# Patient Record
Sex: MALE | Race: WHITE | NOT HISPANIC OR LATINO | Employment: FULL TIME | URBAN - METROPOLITAN AREA
[De-identification: names, ages, dates, MRNs, and addresses within clinical notes are randomized per-mention and may not be internally consistent; named-entity substitution may affect disease eponyms.]

---
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
## 2019-01-22 RX ORDER — CYCLOBENZAPRINE HCL 10 MG
10 TABLET ORAL NIGHTLY PRN
Qty: 30 TABLET | Refills: 0 | Status: SHIPPED | OUTPATIENT
Start: 2019-01-22 | End: 2019-02-20 | Stop reason: SDUPTHER

## 2019-02-20 RX ORDER — CYCLOBENZAPRINE HCL 10 MG
10 TABLET ORAL NIGHTLY PRN
Qty: 30 TABLET | Refills: 0 | Status: SHIPPED | OUTPATIENT
Start: 2019-02-20 | End: 2019-03-21 | Stop reason: SDUPTHER

## 2019-03-06 ENCOUNTER — HOSPITAL ENCOUNTER (OUTPATIENT)
Dept: GENERAL RADIOLOGY | Facility: HOSPITAL | Age: 19
Discharge: HOME | End: 2019-03-06
Admitting: NURSE PRACTITIONER

## 2019-03-06 DIAGNOSIS — D17.9 LIPOMA, UNSPECIFIED SITE: ICD-10-CM

## 2019-03-06 PROCEDURE — 71046 X-RAY EXAM CHEST 2 VIEWS: CPT

## 2019-03-21 RX ORDER — CYCLOBENZAPRINE HCL 10 MG
10 TABLET ORAL NIGHTLY PRN
Qty: 30 TABLET | Refills: 0 | Status: SHIPPED | OUTPATIENT
Start: 2019-03-21 | End: 2019-08-17 | Stop reason: SDUPTHER

## 2019-05-20 ENCOUNTER — TELEPHONE (OUTPATIENT)
Dept: ORTHOPEDIC SURGERY | Facility: CLINIC | Age: 19
End: 2019-05-20

## 2019-05-20 NOTE — TELEPHONE ENCOUNTER
"Patient had lumbar back surgery by ELIZABETH last year. He has been seeing his PCP for some chest, shoulder and arm pain. Wants to talk to ELIZABETH about \"nerve pain\" from surgery.  "

## 2019-05-20 NOTE — TELEPHONE ENCOUNTER
"Return to the patient.  He is been having some chest pain that radiates into his shoulder and down his arm.  He describes it as \"nerve pain\" his primary care physician is done a cardiac work-up as well as a chest x-ray and all those are negative.  According to the patient his primary care is leaning toward more anxiety related.  Patient did have one gabapentin left from a previous prescription and he took that which did help the chest and arm pain considerably.  Patient describes the pain as the same kind of pain as he had when he was having lumbar issues with nerve pain radiating down his leg.  Have advised him to contact his primary care physician to have him evaluated to see if the pain is coming from his neck or his shoulder.  If his PCP does think it is related to his neck or shoulder then would recommend that they contact our office to make an appointment for follow-up.  "

## 2019-05-28 RX ORDER — GABAPENTIN 300 MG/1
CAPSULE ORAL
Qty: 60 CAPSULE | Refills: 0 | Status: SHIPPED | OUTPATIENT
Start: 2019-05-28 | End: 2019-06-24 | Stop reason: SDUPTHER

## 2019-06-25 RX ORDER — GABAPENTIN 300 MG/1
CAPSULE ORAL
Qty: 60 CAPSULE | Refills: 0 | Status: SHIPPED | OUTPATIENT
Start: 2019-06-25 | End: 2019-07-22 | Stop reason: SDUPTHER

## 2019-07-22 RX ORDER — GABAPENTIN 300 MG/1
CAPSULE ORAL
Qty: 60 CAPSULE | Refills: 0 | Status: SHIPPED | OUTPATIENT
Start: 2019-07-22 | End: 2019-08-19 | Stop reason: SDUPTHER

## 2019-08-19 RX ORDER — CYCLOBENZAPRINE HCL 10 MG
10 TABLET ORAL NIGHTLY PRN
Qty: 30 TABLET | Refills: 0 | Status: SHIPPED | OUTPATIENT
Start: 2019-08-19 | End: 2022-09-24

## 2019-08-20 RX ORDER — GABAPENTIN 300 MG/1
CAPSULE ORAL
Qty: 60 CAPSULE | Refills: 0 | Status: SHIPPED | OUTPATIENT
Start: 2019-08-20 | End: 2019-09-16 | Stop reason: SDUPTHER

## 2019-09-16 DIAGNOSIS — Z98.890 S/P LUMBAR DISCECTOMY: Primary | ICD-10-CM

## 2019-09-16 RX ORDER — GABAPENTIN 300 MG/1
CAPSULE ORAL
Qty: 60 CAPSULE | Refills: 0 | Status: SHIPPED | OUTPATIENT
Start: 2019-09-16 | End: 2019-10-19 | Stop reason: SDUPTHER

## 2019-10-19 DIAGNOSIS — Z98.890 S/P LUMBAR DISCECTOMY: ICD-10-CM

## 2019-10-28 RX ORDER — GABAPENTIN 300 MG/1
CAPSULE ORAL
Qty: 60 CAPSULE | Refills: 0 | Status: SHIPPED | OUTPATIENT
Start: 2019-10-28 | End: 2022-09-24

## 2021-10-18 ENCOUNTER — HOSPITAL ENCOUNTER (OUTPATIENT)
Dept: GENERAL RADIOLOGY | Facility: HOSPITAL | Age: 21
Discharge: HOME | End: 2021-10-18
Admitting: UROLOGY

## 2021-10-18 DIAGNOSIS — N20.1 CALCULUS OF URETER: ICD-10-CM

## 2021-10-18 DIAGNOSIS — N20.0 CALCULUS, RENAL: ICD-10-CM

## 2021-10-18 PROCEDURE — 74018 RADEX ABDOMEN 1 VIEW: CPT

## 2023-01-10 ENCOUNTER — OFFICE VISIT (OUTPATIENT)
Dept: FAMILY MEDICINE CLINIC | Facility: CLINIC | Age: 23
End: 2023-01-10
Payer: COMMERCIAL

## 2023-01-10 VITALS
DIASTOLIC BLOOD PRESSURE: 72 MMHG | HEART RATE: 92 BPM | SYSTOLIC BLOOD PRESSURE: 128 MMHG | WEIGHT: 190 LBS | BODY MASS INDEX: 28.14 KG/M2 | OXYGEN SATURATION: 98 % | HEIGHT: 69 IN | RESPIRATION RATE: 16 BRPM

## 2023-01-10 DIAGNOSIS — R10.31 CHRONIC BILATERAL LOWER ABDOMINAL PAIN: ICD-10-CM

## 2023-01-10 DIAGNOSIS — N41.1 CHRONIC PROSTATITIS: Primary | ICD-10-CM

## 2023-01-10 DIAGNOSIS — R10.32 CHRONIC BILATERAL LOWER ABDOMINAL PAIN: ICD-10-CM

## 2023-01-10 DIAGNOSIS — R35.0 INCREASED URINARY FREQUENCY: ICD-10-CM

## 2023-01-10 DIAGNOSIS — G89.29 CHRONIC BILATERAL LOWER ABDOMINAL PAIN: ICD-10-CM

## 2023-01-10 DIAGNOSIS — R35.0 BENIGN PROSTATIC HYPERPLASIA WITH URINARY FREQUENCY: ICD-10-CM

## 2023-01-10 DIAGNOSIS — N40.1 BENIGN PROSTATIC HYPERPLASIA WITH URINARY FREQUENCY: ICD-10-CM

## 2023-01-10 PROCEDURE — 99204 OFFICE O/P NEW MOD 45 MIN: CPT | Performed by: FAMILY MEDICINE

## 2023-01-10 NOTE — PROGRESS NOTES
Chief Complaint  Establish Care (New patient, establishing care with Provider. )    Subjective        Hugo Cagle presents to [facility]  History of Present Illness  The patient has consented to the use of LORA.    The patient presents to the clinic to establish care and follow-up on chronic lower abdominal pain, chronic gastritis, and increased urinary frequency.      The patient complains of abdominal pain, bloating, gas, and increased urinary frequency for the past 1 year. He states that he has been having abdominal pain since 09/2020. He states that he has been having 2 to 3 bowel movements a day. The patient states that he feels like his intestines are moving around throughout the day. He states that he feels like he has gas in his stomach constantly.    The patient states that he had back surgery when he was 17 years old. He states that he had kidney stones in 2018 and it has been intermittent. The patient states that he has been to a urologist for 1 year. He states that he has been to 2 neurologists. The patient states that he has had a cystoscopy, blood work, and STD testing. He states that he was diagnosed with prostatitis. Th patient states that he was prescribed Flomax when he was 17 years old once a day for 1 to 2 months when he was diagnosed with kidney stones. He states that it helped with his urinary flow. He reports that he is not on any medications currently. He mentions that he was seeing Dr. Alex Johnson at the time. The patient states that he has a thinning of his stream and semen discharge. He adds that sometimes it is painful to urinate. He states that he has to urinate every 10 minutes when he is on a road trip. The patient mentions that he was given an antibiotic once and it helped. He states that he was offered pelvic therapy but he never heard anything else. He is unsure ever having his PSA checked. The patient states that he feels like there is a bump or  "mass inside the head of his penis. He reports his urologist thought it was normal. He mentions that it has been awhile since he had a CT scan.    The patient states that he uses e-cigarettes. His parents are both relatively healthy. He had an epidural block, lumbar discectomy at 17 years old. He states that he works at [employer]. The patient states that he gets anxious everyday. He denies any depression.    Objective   Vital Signs:  /72   Pulse 92   Resp 16   Ht 175.3 cm (69\")   Wt 86.2 kg (190 lb)   SpO2 98%   BMI 28.06 kg/m²   Estimated body mass index is 28.06 kg/m² as calculated from the following:    Height as of this encounter: 175.3 cm (69\").    Weight as of this encounter: 86.2 kg (190 lb).             Physical Exam  Constitutional:       Appearance: Normal appearance. He is well-developed.   HENT:      Head: Normocephalic and atraumatic.      Right Ear: Tympanic membrane, ear canal and external ear normal.      Left Ear: Tympanic membrane, ear canal and external ear normal.      Nose: Nose normal.      Mouth/Throat:      Mouth: Mucous membranes are moist.   Eyes:      General: No scleral icterus.     Extraocular Movements: Extraocular movements intact.      Conjunctiva/sclera: Conjunctivae normal.      Pupils: Pupils are equal, round, and reactive to light.   Neck:      Thyroid: No thyromegaly.   Cardiovascular:      Rate and Rhythm: Normal rate and regular rhythm.      Pulses: Normal pulses.      Heart sounds: Normal heart sounds.   Pulmonary:      Effort: Pulmonary effort is normal. No respiratory distress.      Breath sounds: Normal breath sounds. No wheezing or rales.   Abdominal:      General: Bowel sounds are normal. There is no distension.      Palpations: Abdomen is soft. There is no mass.      Tenderness: There is no abdominal tenderness.      Hernia: No hernia is present.   Musculoskeletal:         General: No swelling or tenderness. Normal range of motion.      Cervical back: " Normal range of motion and neck supple.   Lymphadenopathy:      Cervical: No cervical adenopathy.   Skin:     General: Skin is warm.   Neurological:      General: No focal deficit present.      Mental Status: He is alert and oriented to person, place, and time.      Cranial Nerves: No cranial nerve deficit.      Sensory: No sensory deficit.      Deep Tendon Reflexes: Reflexes normal.   Psychiatric:         Mood and Affect: Mood normal.         Behavior: Behavior normal.        Result Review :                   Assessment and Plan   Diagnoses and all orders for this visit:    1. Chronic prostatitis (Primary)  -     Cancel: CT Abdomen Pelvis With Contrast; Future  -     CT Abdomen Pelvis With Contrast; Future    2. Chronic bilateral lower abdominal pain  -     Cancel: CT Abdomen Pelvis With Contrast; Future  -     CBC & Differential  -     Comprehensive Metabolic Panel  -     CT Abdomen Pelvis With Contrast; Future    3. Increased urinary frequency  -     Cancel: CT Abdomen Pelvis With Contrast; Future  -     Urinalysis With Microscopic - Urine, Clean Catch  -     CT Abdomen Pelvis With Contrast; Future    4. Benign prostatic hyperplasia with urinary frequency  -     Cancel: CT Abdomen Pelvis With Contrast; Future    Other orders  -     Microscopic Examination -    PLAN:   Hugo Cagle is here to establish care and follow up on chronic lower abdominal pain, chronic gastritis, and increased urinary frequency.     The patient is having these symptoms for more than 2 years. He has seen a urologist in the past. He is complaining of dribbling, thinning of the stream,  with increased urinary frequency and lower abdominal pain. He was diagnosed with BPH. He is also complaining of having a lot of gas and bloating. He has a history of kidney stones in the past. I will schedule him for a CT scan of abdomen and pelvis. Also, I will do CBC and CMP. I advised him to start taking Metamucil fiber every day and MiraLAX. After the  labs and the urinalysis, we might give him a trial of antibiotic for 10 to 14 days for prostatitis. I will also review the labs of his old record from the urology's office. Follow up after the labs and CT scan.         Follow Up   No follow-ups on file.  Patient was given instructions and counseling regarding his condition or for health maintenance advice. Please see specific information pulled into the AVS if appropriate.     Transcribed from ambient dictation for Reena Velez MD by Pushpa Walsh.  01/10/23   16:30 EST    Patient or patient representative verbalized consent to the visit recording.  I have personally performed the services described in this document as transcribed by the above individual, and it is both accurate and complete.

## 2023-01-11 ENCOUNTER — PATIENT ROUNDING (BHMG ONLY) (OUTPATIENT)
Dept: FAMILY MEDICINE CLINIC | Facility: CLINIC | Age: 23
End: 2023-01-11
Payer: COMMERCIAL

## 2023-01-11 LAB
ALBUMIN SERPL-MCNC: 5.4 G/DL (ref 3.5–5.2)
ALBUMIN/GLOB SERPL: 3.2 G/DL
ALP SERPL-CCNC: 92 U/L (ref 39–117)
ALT SERPL-CCNC: 18 U/L (ref 1–41)
APPEARANCE UR: CLEAR
AST SERPL-CCNC: 20 U/L (ref 1–40)
BACTERIA #/AREA URNS HPF: NORMAL /HPF
BASOPHILS # BLD AUTO: 0.04 10*3/MM3 (ref 0–0.2)
BASOPHILS NFR BLD AUTO: 0.4 % (ref 0–1.5)
BILIRUB SERPL-MCNC: 0.5 MG/DL (ref 0–1.2)
BILIRUB UR QL STRIP: NEGATIVE
BUN SERPL-MCNC: 12 MG/DL (ref 6–20)
BUN/CREAT SERPL: 15.4 (ref 7–25)
CALCIUM SERPL-MCNC: 10.2 MG/DL (ref 8.6–10.5)
CASTS URNS MICRO: NORMAL
CHLORIDE SERPL-SCNC: 102 MMOL/L (ref 98–107)
CO2 SERPL-SCNC: 26.7 MMOL/L (ref 22–29)
COLOR UR: YELLOW
CREAT SERPL-MCNC: 0.78 MG/DL (ref 0.76–1.27)
EGFRCR SERPLBLD CKD-EPI 2021: 128.5 ML/MIN/1.73
EOSINOPHIL # BLD AUTO: 0.11 10*3/MM3 (ref 0–0.4)
EOSINOPHIL NFR BLD AUTO: 1.2 % (ref 0.3–6.2)
EPI CELLS #/AREA URNS HPF: NORMAL /HPF
ERYTHROCYTE [DISTWIDTH] IN BLOOD BY AUTOMATED COUNT: 12 % (ref 12.3–15.4)
GLOBULIN SER CALC-MCNC: 1.7 GM/DL
GLUCOSE SERPL-MCNC: 82 MG/DL (ref 65–99)
GLUCOSE UR QL STRIP: NEGATIVE
HCT VFR BLD AUTO: 48.4 % (ref 37.5–51)
HGB BLD-MCNC: 16 G/DL (ref 13–17.7)
HGB UR QL STRIP: NEGATIVE
IMM GRANULOCYTES # BLD AUTO: 0.04 10*3/MM3 (ref 0–0.05)
IMM GRANULOCYTES NFR BLD AUTO: 0.4 % (ref 0–0.5)
KETONES UR QL STRIP: NEGATIVE
LEUKOCYTE ESTERASE UR QL STRIP: NEGATIVE
LYMPHOCYTES # BLD AUTO: 2.36 10*3/MM3 (ref 0.7–3.1)
LYMPHOCYTES NFR BLD AUTO: 26 % (ref 19.6–45.3)
MCH RBC QN AUTO: 29.6 PG (ref 26.6–33)
MCHC RBC AUTO-ENTMCNC: 33.1 G/DL (ref 31.5–35.7)
MCV RBC AUTO: 89.6 FL (ref 79–97)
MONOCYTES # BLD AUTO: 0.46 10*3/MM3 (ref 0.1–0.9)
MONOCYTES NFR BLD AUTO: 5.1 % (ref 5–12)
NEUTROPHILS # BLD AUTO: 6.05 10*3/MM3 (ref 1.7–7)
NEUTROPHILS NFR BLD AUTO: 66.9 % (ref 42.7–76)
NITRITE UR QL STRIP: NEGATIVE
NRBC BLD AUTO-RTO: 0 /100 WBC (ref 0–0.2)
PH UR STRIP: 7 [PH] (ref 5–8)
PLATELET # BLD AUTO: 284 10*3/MM3 (ref 140–450)
POTASSIUM SERPL-SCNC: 4.5 MMOL/L (ref 3.5–5.2)
PROT SERPL-MCNC: 7.1 G/DL (ref 6–8.5)
PROT UR QL STRIP: NEGATIVE
RBC # BLD AUTO: 5.4 10*6/MM3 (ref 4.14–5.8)
RBC #/AREA URNS HPF: NORMAL /HPF
SODIUM SERPL-SCNC: 140 MMOL/L (ref 136–145)
SP GR UR STRIP: 1.02 (ref 1–1.03)
UROBILINOGEN UR STRIP-MCNC: NORMAL MG/DL
WBC # BLD AUTO: 9.06 10*3/MM3 (ref 3.4–10.8)
WBC #/AREA URNS HPF: NORMAL /HPF

## 2023-01-11 NOTE — PROGRESS NOTES
A My-Chart message has been sent to the patient for PATIENT ROUNDING with [facility]

## 2023-01-13 ENCOUNTER — TELEPHONE (OUTPATIENT)
Dept: FAMILY MEDICINE CLINIC | Facility: CLINIC | Age: 23
End: 2023-01-13
Payer: COMMERCIAL

## 2023-01-13 NOTE — TELEPHONE ENCOUNTER
Left vm to call back      ----- Message from Reena Velez MD sent at 1/12/2023  8:35 PM EST -----  Al labs normal , lets wait for ct scan before I start you on antibitics

## 2023-01-26 ENCOUNTER — HOSPITAL ENCOUNTER (OUTPATIENT)
Dept: CT IMAGING | Facility: HOSPITAL | Age: 23
Discharge: HOME | End: 2023-01-26
Admitting: FAMILY MEDICINE
Payer: COMMERCIAL

## 2023-01-26 DIAGNOSIS — G89.29 CHRONIC BILATERAL LOWER ABDOMINAL PAIN: ICD-10-CM

## 2023-01-26 DIAGNOSIS — N41.1 CHRONIC PROSTATITIS: ICD-10-CM

## 2023-01-26 DIAGNOSIS — R35.0 INCREASED URINARY FREQUENCY: ICD-10-CM

## 2023-01-26 DIAGNOSIS — R10.32 CHRONIC BILATERAL LOWER ABDOMINAL PAIN: ICD-10-CM

## 2023-01-26 DIAGNOSIS — R10.31 CHRONIC BILATERAL LOWER ABDOMINAL PAIN: ICD-10-CM

## 2023-01-26 PROCEDURE — 25010000002 IOPAMIDOL 61 % SOLUTION: Performed by: FAMILY MEDICINE

## 2023-01-26 PROCEDURE — 0 DIATRIZOATE MEGLUMINE & SODIUM PER 1 ML: Performed by: FAMILY MEDICINE

## 2023-01-26 PROCEDURE — 74177 CT ABD & PELVIS W/CONTRAST: CPT

## 2023-01-26 RX ADMIN — IOPAMIDOL 85 ML: 612 INJECTION, SOLUTION INTRAVENOUS at 09:42

## 2023-01-26 RX ADMIN — DIATRIZOATE MEGLUMINE AND DIATRIZOATE SODIUM 30 ML: 660; 100 LIQUID ORAL; RECTAL at 08:35

## 2023-01-31 ENCOUNTER — TELEPHONE (OUTPATIENT)
Dept: FAMILY MEDICINE CLINIC | Facility: CLINIC | Age: 23
End: 2023-01-31
Payer: COMMERCIAL

## 2023-01-31 NOTE — TELEPHONE ENCOUNTER
Caller: Hugo Cagle    Relationship: Self    Best call back number: [phone]    Caller requesting test results: SELF    What test was performed: CT SCAN    When was the test performed: 1/26/23    Where was the test performed: HOSPITAL    Additional notes: PLEASE CALL AND GO OVER RESULTS, HE HAS FURTHER QUESTIONS ALSO.

## 2023-02-02 ENCOUNTER — OFFICE VISIT (OUTPATIENT)
Dept: FAMILY MEDICINE CLINIC | Facility: CLINIC | Age: 23
End: 2023-02-02
Payer: COMMERCIAL

## 2023-02-02 VITALS
WEIGHT: 191 LBS | SYSTOLIC BLOOD PRESSURE: 141 MMHG | TEMPERATURE: 96.8 F | BODY MASS INDEX: 28.29 KG/M2 | OXYGEN SATURATION: 97 % | DIASTOLIC BLOOD PRESSURE: 76 MMHG | HEART RATE: 77 BPM | HEIGHT: 69 IN

## 2023-02-02 DIAGNOSIS — N41.1 CHRONIC PROSTATITIS: Primary | ICD-10-CM

## 2023-02-02 DIAGNOSIS — R35.0 INCREASED URINARY FREQUENCY: ICD-10-CM

## 2023-02-02 DIAGNOSIS — N50.811 TESTICULAR PAIN, RIGHT: ICD-10-CM

## 2023-02-02 PROCEDURE — 99214 OFFICE O/P EST MOD 30 MIN: CPT | Performed by: FAMILY MEDICINE

## 2023-02-02 RX ORDER — TAMSULOSIN HYDROCHLORIDE 0.4 MG/1
1 CAPSULE ORAL DAILY
Qty: 90 CAPSULE | Refills: 0 | Status: SHIPPED | OUTPATIENT
Start: 2023-02-02

## 2023-02-02 RX ORDER — SULFAMETHOXAZOLE AND TRIMETHOPRIM 800; 160 MG/1; MG/1
1 TABLET ORAL 2 TIMES DAILY
Qty: 28 TABLET | Refills: 0 | Status: SHIPPED | OUTPATIENT
Start: 2023-02-02

## 2023-02-02 NOTE — PROGRESS NOTES
"Chief Complaint  Abdominal Pain (F/u on CT ), Testicle Pain (Left x occurring more often in last week ), and Urinary Frequency (Worsened in last week )    Subjective        Hugo Cagle presents to [facility]  History of Present Illness for follow-up on lower abdominal pain.  Patient with history of chronic lower abdominal pain diagnosed with BPH.  Not on any medication.  Complaining of thinning of the stream and dribbling of the urine and increased urinary frequency.  He is also complaining on and off testicular pain which has increased in last 1 week.  Has seen urologist before.  Denies any fever denies any nausea vomiting CT scan was done he is here to discuss the results    Objective   Vital Signs:  /76   Pulse 77   Temp 96.8 °F (36 °C) (Temporal)   Ht 175.3 cm (69\")   Wt 86.6 kg (191 lb)   SpO2 97%   BMI 28.21 kg/m²   Estimated body mass index is 28.21 kg/m² as calculated from the following:    Height as of this encounter: 175.3 cm (69\").    Weight as of this encounter: 86.6 kg (191 lb).             Physical Exam  Constitutional:       General: He is not in acute distress.     Appearance: Normal appearance. He is well-developed.   HENT:      Head: Normocephalic and atraumatic.      Right Ear: Tympanic membrane normal.      Left Ear: Tympanic membrane normal.      Mouth/Throat:      Mouth: Mucous membranes are moist.   Eyes:      General:         Right eye: No discharge.         Left eye: No discharge.      Extraocular Movements: Extraocular movements intact.      Pupils: Pupils are equal, round, and reactive to light.   Cardiovascular:      Rate and Rhythm: Normal rate and regular rhythm.      Pulses: Normal pulses.      Heart sounds: Normal heart sounds.   Pulmonary:      Effort: Pulmonary effort is normal.      Breath sounds: Normal breath sounds. No wheezing or rales.   Abdominal:      General: Bowel sounds are normal.      Palpations: Abdomen is soft. There is " no mass.      Tenderness: There is no abdominal tenderness.   Musculoskeletal:      Cervical back: Normal range of motion and neck supple.      Right lower leg: No edema.      Left lower leg: No edema.   Lymphadenopathy:      Cervical: No cervical adenopathy.   Neurological:      General: No focal deficit present.      Mental Status: He is alert and oriented to person, place, and time.        Result Review :      Data reviewed: Radiologic studies ct scan abdomen and pelvis    IMPRESSION:  1.  No acute intra-abdominal or pelvic process.  2.  Punctate nonobstructing bilateral nephrolithiasis.  3.  Please see above for additional findings.          Assessment and Plan   Diagnoses and all orders for this visit:    1. Chronic prostatitis (Primary)  -     Ambulatory Referral to Urology  -     sulfamethoxazole-trimethoprim (Bactrim DS) 800-160 MG per tablet; Take 1 tablet by mouth 2 (Two) Times a Day.  Dispense: 28 tablet; Refill: 0  -     tamsulosin (FLOMAX) 0.4 MG capsule 24 hr capsule; Take 1 capsule by mouth Daily.  Dispense: 90 capsule; Refill: 0    2. Increased urinary frequency  -     Ambulatory Referral to Urology    3. Testicular pain, right  -     US scrotum and testicles      Hugo is a 22-year-old male patient seen today for follow-up on abdominal pain and urinary frequency and thinning of the stream.  I again reviewed urologist note and they have diagnosed him with BPH but currently he is not on any medication CT scan finding discussed with patient has calcification on prostate but all his symptoms will start him on antibiotic for 2 weeks we will also start him Flomax for his urinary symptoms.  Will refer him to urologist he is also complaining of testicular pain which is going on for long time but had experienced similar pain 2 or 3 times last week.  Will schedule him for ultrasound scrotum and testes.  Follow-up in 2 weeks.  Also advised patient to start  probiotic with antibiotic         Follow Up   No  follow-ups on file.  Patient was given instructions and counseling regarding his condition or for health maintenance advice. Please see specific information pulled into the AVS if appropriate.

## 2023-02-03 ENCOUNTER — TELEPHONE (OUTPATIENT)
Dept: FAMILY MEDICINE CLINIC | Facility: CLINIC | Age: 23
End: 2023-02-03

## 2023-02-03 NOTE — TELEPHONE ENCOUNTER
Caller: Hugo Cagle    Relationship: Self    Best call back number: [phone]    What specialty or service is being requested: UROLOGY    What is the provider, practice or medical service name: [facility]    Any additional details: PATIENT STATES THAT HE RECEIVED A CALL FROM [facility] FOR A REFERRAL, BUT HAS SEEN THEM IN THE PAST AND THEY DID NOT HELP. REQUESTS TO HAVE REFERRAL SENT TO [facility]

## 2023-02-17 ENCOUNTER — HOSPITAL ENCOUNTER (OUTPATIENT)
Dept: ULTRASOUND IMAGING | Facility: HOSPITAL | Age: 23
Discharge: HOME | End: 2023-02-17
Admitting: FAMILY MEDICINE
Payer: COMMERCIAL

## 2023-02-17 PROCEDURE — 76870 US EXAM SCROTUM: CPT

## 2023-02-17 PROCEDURE — 93976 VASCULAR STUDY: CPT

## 2023-04-29 DIAGNOSIS — N41.1 CHRONIC PROSTATITIS: ICD-10-CM

## 2023-05-01 RX ORDER — TAMSULOSIN HYDROCHLORIDE 0.4 MG/1
1 CAPSULE ORAL DAILY
Qty: 90 CAPSULE | Refills: 1 | Status: SHIPPED | OUTPATIENT
Start: 2023-05-01

## 2023-05-01 NOTE — TELEPHONE ENCOUNTER
Rx Refill Note  Requested Prescriptions     Pending Prescriptions Disp Refills   • tamsulosin (FLOMAX) 0.4 MG capsule 24 hr capsule [Pharmacy Med Name: TAMSULOSIN 0.4MG CAPSULES] 90 capsule 1     Sig: TAKE 1 CAPSULE BY MOUTH DAILY      Last office visit with prescribing clinician: 2/2/2023   Last telemedicine visit with prescribing clinician: Visit date not found   Next office visit with prescribing clinician: Visit date not found       {TIP  Please add Last Relevant Lab Date if appropriate: 01/10/23                 Would you like a call back once the refill request has been completed: [] Yes [] No    If the office needs to give you a call back, can they leave a voicemail: [] Yes [] No    Josey Jimenez MA  05/01/23, 10:19 EDT

## 2023-06-01 PROBLEM — L60.0 INGROWN NAIL: Status: ACTIVE | Noted: 2018-07-30

## 2023-06-01 PROBLEM — F41.9 ANXIETY: Status: ACTIVE | Noted: 2018-10-25

## 2023-06-01 PROBLEM — R03.0 ELEVATED BLOOD PRESSURE READING: Status: ACTIVE | Noted: 2020-11-04

## 2023-06-01 PROBLEM — J10.1 INFLUENZA DUE TO INFLUENZA A VIRUS: Status: ACTIVE | Noted: 2019-01-09

## 2023-06-01 PROBLEM — G47.9 DIFFICULTY SLEEPING: Status: ACTIVE | Noted: 2018-07-30

## 2023-06-01 PROBLEM — L01.00 IMPETIGO: Status: ACTIVE | Noted: 2019-02-15

## 2023-06-01 PROBLEM — IMO0002 PARONYCHIA: Status: ACTIVE | Noted: 2018-08-21

## 2023-06-01 PROBLEM — R07.89 CHEST WALL PAIN: Status: ACTIVE | Noted: 2018-10-25

## 2023-06-01 PROBLEM — D17.9 LIPOMA: Status: ACTIVE | Noted: 2018-11-01

## 2023-06-01 PROBLEM — J20.9 ACUTE BRONCHITIS: Status: ACTIVE | Noted: 2019-11-21

## 2023-06-01 PROBLEM — K21.9 GASTROESOPHAGEAL REFLUX DISEASE: Status: ACTIVE | Noted: 2018-11-01

## 2023-06-01 PROBLEM — F42.9 OBSESSIVE-COMPULSIVE DISORDER: Status: ACTIVE | Noted: 2020-09-28

## 2023-09-05 ENCOUNTER — OFFICE VISIT (OUTPATIENT)
Dept: FAMILY MEDICINE CLINIC | Facility: CLINIC | Age: 23
End: 2023-09-05
Payer: COMMERCIAL

## 2023-09-05 VITALS
OXYGEN SATURATION: 97 % | TEMPERATURE: 96.2 F | DIASTOLIC BLOOD PRESSURE: 68 MMHG | HEIGHT: 69 IN | WEIGHT: 174.7 LBS | HEART RATE: 95 BPM | BODY MASS INDEX: 25.87 KG/M2 | SYSTOLIC BLOOD PRESSURE: 110 MMHG

## 2023-09-05 DIAGNOSIS — N50.811 TESTICULAR PAIN, RIGHT: Primary | ICD-10-CM

## 2023-09-05 DIAGNOSIS — R10.31 RIGHT LOWER QUADRANT PAIN: ICD-10-CM

## 2023-09-05 NOTE — PROGRESS NOTES
"Chief Complaint  Hernia (Pelvic Floor therapy discuss.  Testicular pain has increased since last time (1 year ago).)    Subjective        Hugo Cagle presents to [facility]  History of Present Illness as above.  History of chronic testicular pain.  Finished pelvic floor exercises want to discuss the pelvic floor exercises and hernia.  No new symptoms since last visit    Objective   Vital Signs:  /68   Pulse 95   Temp 96.2 °F (35.7 °C) (Temporal)   Ht 175.3 cm (69.02\")   Wt 79.2 kg (174 lb 11.2 oz)   SpO2 97%   BMI 25.79 kg/m²   Estimated body mass index is 25.79 kg/m² as calculated from the following:    Height as of this encounter: 175.3 cm (69.02\").    Weight as of this encounter: 79.2 kg (174 lb 11.2 oz).             Physical Exam   Result Review :      Data reviewed : Radiologic studies ct abdomen    Narrative & Impression   CT ABDOMEN AND PELVIS WITH IV CONTRAST     HISTORY: Lower abdominal pain. Chronic prostatitis. Increased urinary  frequency.     TECHNIQUE: Radiation dose reduction techniques were utilized, including  automated exposure control and exposure modulation based on body size.   3 mm images were obtained through the abdomen and pelvis after the  administration of IV contrast.      COMPARISON: 07/03/2017     FINDINGS:  Lower chest: Within normal limits.     Liver: Within normal limits.     Biliary tract: Within normal limits.     Spleen: Within normal limits.     Pancreas: Within normal limits.     Adrenals: Within normal limits.     Kidneys:  Punctate nonobstructing stones in the inter and lower pole of  the left kidney. Punctate nonobstructing stone right lower pole.     Bowel:  Within normal limits. Normal appendix.     Peritoneum: Within normal limits.     Vasculature:    Within normal limits. Circumaortic left renal vein.     Lymph Nodes:  Within normal limits.                                                            Pelvic organs: The " bladder is incompletely distended. Prostatic  calcifications. No focal fluid collections or inflammatory changes.     Abdominal/Pelvic Wall: Tiny fat-containing umbilical hernia.     Bones: Within normal limits. Stable sclerotic focus in the left ischium  favoring a bone island.     IMPRESSION:  1.  No acute intra-abdominal or pelvic process.  2.  Punctate nonobstructing bilateral nephrolithiasis.  3.  Please see above for additional findings.      Ultrasound testies and scrotum    1.  Mobile echogenic foci in the region of the left epididymal body,  which are nonspecific but can be seen with epididymal obstruction, less  likely filarial infection in the appropriate clinical context.  2.  Normal sonographic appearance of the testes.                 Assessment and Plan   Diagnoses and all orders for this visit:    1. Testicular pain, right (Primary)    2. Right lower quadrant pain      Patient left the exam room without finishing the visit.  23-year-old male patient with history of testicular pain and right abdominal pain for long time has seen to urologist.  He has seen me for the same last year we referred to [facility].  As per patient they were very rude.  He has also finished pelvic floor exercises.  Which has helped him little with the pain.  When I tried to tell patient that tiny medical hernia cannot cause this 1 symptoms what I can refer him to urology but I do not have too much for him to offer for his chronic testicular pain and right lower quadrant pain.  Patient got upset and left the exam room without me finishing the examination.       Follow Up   There are no Patient Instructions on file for this visit.   No follow-ups on file.  Patient was given instructions and counseling regarding his condition or for health maintenance advice. Please see specific information pulled into the AVS if appropriate.     Answers submitted by the patient for this visit:  Office Visit on 9/5/2023  3:00 PM with Reena  Darlin Velez MD  Primary Reason for Visit (Submitted on 9/5/2023)  What is the primary reason for your visit?: Other

## 2024-05-15 ENCOUNTER — TELEPHONE (OUTPATIENT)
Dept: FAMILY MEDICINE CLINIC | Facility: CLINIC | Age: 24
End: 2024-05-15
Payer: COMMERCIAL

## 2024-05-16 ENCOUNTER — OFFICE VISIT (OUTPATIENT)
Dept: FAMILY MEDICINE CLINIC | Facility: CLINIC | Age: 24
End: 2024-05-16
Payer: COMMERCIAL

## 2024-05-16 VITALS
OXYGEN SATURATION: 98 % | HEIGHT: 69 IN | HEART RATE: 92 BPM | WEIGHT: 193.7 LBS | RESPIRATION RATE: 12 BRPM | SYSTOLIC BLOOD PRESSURE: 133 MMHG | BODY MASS INDEX: 28.69 KG/M2 | TEMPERATURE: 98.7 F | DIASTOLIC BLOOD PRESSURE: 89 MMHG

## 2024-05-16 DIAGNOSIS — K62.89 CYST OF PERIANAL AREA: Primary | ICD-10-CM

## 2024-05-16 PROCEDURE — 99213 OFFICE O/P EST LOW 20 MIN: CPT | Performed by: NURSE PRACTITIONER

## 2024-05-16 NOTE — PROGRESS NOTES
"Chief Complaint  Cyst    Subjective        Hugo Cagle presents to [facility]  History of Present Illness    History of Present Illness  The patient is a 23-year-old male who presents for evaluation of a bump or cyst in the rectal area.    The patient reports a palpable, non-painful cyst in the rectal area that has been present for approximately 2 weeks. Initially, he attributed the cyst to a hemorrhoid and subsequently applied hemorrhoid cream, Preparation H, which resulted in a reduction of the cyst's size, however, the cyst remained. The patient denies experiencing constipation, but notes irregular bowel movements, with occasional loose stools. Also denies any presence of blood in stool. This is a new development for him. He has been diagnosed with pelvic floor dysfunction and engages in daily pelvic stretches, which often result in significant tightness in the area. Has previously undergone physical therapy for this and is uncertain if the current symptoms are interconnected. Attempts to avoid wiping and does not rinse the area post-defecation. He denies experiencing fever or chills. The cyst is not painful unless pressure is applied.    Supplemental Information  For the last 3 to 4 days,  hands and jaw have been a little shaky.  thinks it could be anxiety because it happens when sitting at night watching TV.       Objective   Vital Signs:  /89   Pulse 92   Temp 98.7 °F (37.1 °C) (Temporal)   Resp 12   Ht 175.3 cm (69.02\")   Wt 87.9 kg (193 lb 11.2 oz)   SpO2 98%   BMI 28.59 kg/m²   Estimated body mass index is 28.59 kg/m² as calculated from the following:    Height as of this encounter: 175.3 cm (69.02\").    Weight as of this encounter: 87.9 kg (193 lb 11.2 oz).             Physical Exam  Vitals and nursing note reviewed.   Constitutional:       General: He is not in acute distress.     Appearance: He is well-developed. He is not ill-appearing or diaphoretic. "   HENT:      Head: Normocephalic and atraumatic.   Eyes:      General:         Right eye: No discharge.         Left eye: No discharge.      Conjunctiva/sclera: Conjunctivae normal.   Pulmonary:      Effort: Pulmonary effort is normal.   Genitourinary:     Comments: Grey cystic lesion appr 5PM adjacent to anus-no erythema-mildly tender with deep palpation, no discharge  Musculoskeletal:         General: No deformity.      Comments: Gait smooth and steady   Skin:     General: Skin is warm and dry.   Neurological:      Mental Status: He is alert and oriented to person, place, and time.   Psychiatric:         Mood and Affect: Mood normal.         Behavior: Behavior normal.          Physical Exam       Result Review :            Results                  Assessment and Plan     Diagnoses and all orders for this visit:    1. Cyst of perianal area (Primary)  -     Ambulatory Referral to Colorectal Surgery        Assessment & Plan  1. Presence of a cyst in the rectal area.  The patient is advised to rinse the area post-defecation to mitigate the irritation. An urgent referral will be made to a colorectal specialist for potential surgical removal of the cyst. The patient is advised to perform Sitz baths once or twice daily. The application of bacitracin ointment is also recommended. In the event of symptom exacerbation, pain, fever, or chills, the patient is advised to seek immediate medical attention at an urgent care facility or send a message.            Follow Up     No follow-ups on file.  Patient was given instructions and counseling regarding his condition or for health maintenance advice. Please see specific information pulled into the AVS if appropriate.    Patient or patient representative verbalized consent for the use of Ambient Listening during the visit with  ARON Cueto for chart documentation. 5/30/2024  16:30 EDT      Answers submitted by the patient for this visit:  Primary Reason for Visit  (Submitted on 5/16/2024)  What is the primary reason for your visit?: Other  Other (Submitted on 5/16/2024)  Please describe your symptoms.: bump/ cyst or something on rectum.. for , have never had hemmorhoid so i got some ointment and it shrunk the size but still is a good size  and uncomfortable but does not seek to be going away. i do stretches daily for my pelivc floor, my muscles and everything is usually pretty tight in that area, so it does add on to the discomfort, but i am not sure if it is a cyst, a mass , a hemmorhoid etc.  Have you had these symptoms before?: No  How long have you been having these symptoms?: 1-2 weeks  Please describe any probable cause for these symptoms. : unsure, with my pelvic floor issues, my pelvic muscles, peroneal nerve and rectum is typically very tight im not sure if that has any correaltion but with it being in the same area it may be useful to mention.

## 2024-05-20 ENCOUNTER — OFFICE VISIT (OUTPATIENT)
Dept: SURGERY | Facility: CLINIC | Age: 24
End: 2024-05-20
Payer: COMMERCIAL

## 2024-05-20 VITALS
HEART RATE: 81 BPM | SYSTOLIC BLOOD PRESSURE: 122 MMHG | OXYGEN SATURATION: 99 % | HEIGHT: 69 IN | BODY MASS INDEX: 29.31 KG/M2 | DIASTOLIC BLOOD PRESSURE: 84 MMHG | WEIGHT: 197.9 LBS

## 2024-05-20 DIAGNOSIS — K64.5 THROMBOSED EXTERNAL HEMORRHOID: Primary | ICD-10-CM

## 2024-05-20 PROCEDURE — 46600 DIAGNOSTIC ANOSCOPY SPX: CPT | Performed by: PHYSICIAN ASSISTANT

## 2024-05-20 PROCEDURE — 99203 OFFICE O/P NEW LOW 30 MIN: CPT | Performed by: PHYSICIAN ASSISTANT

## 2024-05-20 NOTE — PROGRESS NOTES
"Hugo Cagle is a 23 y.o. male who is seen as a consult at the request of ARON Cueto for Cyst (Perianal Area/).      HPI:  Pt presents today for evaluation of a perianal bump that appeared 2-3 weeks ago.  States that the bump was tender initially.   Pain has since subsided, but still aware that it is there.  States the bump has remained relatively the same size.  No bleeding or drainage.   Used hemorrhoid cream without improvement.   No Hx of perirectal abscesses.   Pt was seen by his PCP 05/16/2024 and told the area was a cyst.     Typically has 3-4 BM daily.   Stools have been looser lately.  Had 1-2 episodes where he noticed \"white foam\" in his stool.  He states his girlfriend had a GI virus x1 day last week.  He denies any other ill contacts.  No recent travel or use of antibiotics.     Pt reports Hx of pelvic floor dysfunction.  States his sphincter muscle feels tight.  Attended PT in the past and continues to perform exercises and breathing techniques during defecation.     Not taking any ASA or anticoagulation.   No previous colonoscopy.   No FHx of colon polyps, colon cancer, or IBD.        Past Medical History:   Diagnosis Date    ADHD (attention deficit hyperactivity disorder)     Benign prostatic hyperplasia 2022    possible prostatitis    History of kidney stones     Injury of back     Kidney stone 7/1/2018    Low back pain     Lumbar herniated disc     PONV (postoperative nausea and vomiting)        Past Surgical History:   Procedure Laterality Date    EAR TUBES      EPIDURAL BLOCK      LUMBAR LAMINECTOMY DISCECTOMY DECOMPRESSION N/A 4/16/2018    Procedure: Right L4-5 discectomy;  Surgeon: Brien Talbot MD;  Location: [facility] OR;  Service: Neurosurgery    LUMBAR LAMINECTOMY DISCECTOMY DECOMPRESSION Right 6/7/2018    Procedure: repeat right L4-5 discectomy;  Surgeon: Brien Talbot MD;  Location: [facility] OR;  Service: Neurosurgery    MYRINGOTOMY WITH REMOVAL OF EAR TUBES   "    NOSE SURGERY         Social History:   reports that he quit smoking about 4 months ago. His smoking use included electronic cigarette. He started smoking about 4 years ago. He has been exposed to tobacco smoke. He quit smokeless tobacco use about 3 months ago. He reports that he does not currently use alcohol. He reports that he does not use drugs.      Marriage status: Single    Family History   Problem Relation Age of Onset    Cancer Maternal Uncle         throat    COPD Maternal Grandmother     Heart disease Maternal Grandmother     Diabetes Maternal Grandmother     COPD Maternal Grandfather     Heart disease Paternal Grandmother     Cancer Paternal Grandfather         lung, bladder    Malig Hyperthermia Neg Hx        No current outpatient medications on file.    Allergy  Tramadol      Vitals:    05/20/24 1126   BP: 122/84   Pulse: 81   SpO2: 99%     Body mass index is 29.22 kg/m².        Physical Exam  Exam conducted with a chaperone present.   Constitutional:       General: He is not in acute distress.     Appearance: He is well-developed.   HENT:      Head: Normocephalic and atraumatic.      Nose: Nose normal.   Eyes:      Conjunctiva/sclera: Conjunctivae normal.      Pupils: Pupils are equal, round, and reactive to light.   Neck:      Trachea: No tracheal deviation.   Pulmonary:      Effort: Pulmonary effort is normal. No respiratory distress.      Breath sounds: Normal breath sounds.   Abdominal:      General: Bowel sounds are normal. There is no distension.      Palpations: Abdomen is soft.   Genitourinary:     Comments: Perianal exam: Small thrombosed external hemorrhoid in the right anterior position. Soft, non-tender.    CELSA- Incresaed tone, no masses  Anoscopy performed: Internal hemorrhoids WNL.   Musculoskeletal:         General: No deformity. Normal range of motion.      Cervical back: Normal range of motion.   Skin:     General: Skin is warm and dry.   Neurological:      Mental Status: He is  alert and oriented to person, place, and time.      Cranial Nerves: No cranial nerve deficit.      Coordination: Coordination normal.      Gait: Gait normal.   Psychiatric:         Behavior: Behavior normal.         Judgment: Judgment normal.         Review of Medical Record:  I reviewed medical records as detailed in HPI.     Assessment:  1. Thrombosed external hemorrhoid    - New    Plan:  - Discussed physical exam findings with Pt. He has a resolving thrombosed hemorrhoid in the right anterior position. He is past the point where a thrombectomy would be beneficial as it has been 2-3 weeks since symptom onset. Discussed with Pt that the clot will continue to dissolve with time.   - For the increased sphincter tone, an Rx for Diltiazem/Lidocaine compound cream was sent to [facility]. Pt instructed to apply externally TID x10 weeks. May consider chemical sphincterotomy with Botox or a lateral internal anal sphincterotomy if symptoms do not improve with conservative management.   - Will consider colonoscopy at next office visit if symptoms do not improve.   - Follow up in 6-8 weeks for reevaluation

## 2024-05-24 ENCOUNTER — PATIENT ROUNDING (BHMG ONLY) (OUTPATIENT)
Dept: SURGERY | Facility: CLINIC | Age: 24
End: 2024-05-24
Payer: COMMERCIAL

## 2024-05-24 NOTE — PROGRESS NOTES
May 24, 2024    Hello, may I speak with Hugo Cagle?    My name is Jacklyn Messina      I am  with [facility]  [address]  [place] [id number] [phone].    Before we get started may I verify your date of birth? 2000    I am calling to officially welcome you to our practice and ask about your recent visit. Is this a good time to talk? Patient hung up the phone and did not get to speak with him. Will call back     Tell me about your visit with us. What things went well?         We're always looking for ways to make our patients' experiences even better. Do you have recommendations on ways we may improve?      Overall were you satisfied with your first visit to our practice?        I appreciate you taking the time to speak with me today. Is there anything else I can do for you?       Thank you, and have a great day.

## 2024-07-09 ENCOUNTER — OFFICE VISIT (OUTPATIENT)
Dept: SURGERY | Facility: CLINIC | Age: 24
End: 2024-07-09
Payer: COMMERCIAL

## 2024-07-09 VITALS
WEIGHT: 209.5 LBS | DIASTOLIC BLOOD PRESSURE: 68 MMHG | HEIGHT: 68 IN | OXYGEN SATURATION: 99 % | HEART RATE: 79 BPM | BODY MASS INDEX: 31.75 KG/M2 | SYSTOLIC BLOOD PRESSURE: 118 MMHG

## 2024-07-09 DIAGNOSIS — K64.5 THROMBOSED EXTERNAL HEMORRHOID: Primary | ICD-10-CM

## 2024-07-09 DIAGNOSIS — K62.89 ANAL PAIN: ICD-10-CM

## 2024-07-09 RX ORDER — DILTIAZEM HCL
POWDER (GRAM) MISCELLANEOUS
COMMUNITY
Start: 2024-05-28

## 2024-07-09 NOTE — PROGRESS NOTES
"Hugo Cagle is a 24 y.o. male in for follow up of Thrombosed external hemorrhoid    Anal pain    Pt was last seen on 05/20/2024 for a thrombosed hemorrhoid and increased sphincter tone.   Conservative treatment for the hemorrhoid was recommended and Pt was given an Rx for Diltiazem/Lidocaine compound cream for the increased sphincter tone.   He presents today for a follow up.  States that the hemorrhoid resolved 1-2 weeks after our last office visit and has not experienced any additional flare-ups since then.   Feels improvement in the sphincter tone and does not feel it is as tight.   He has been less consistent with applying this cream though and states on days he does not apply it, the area feels tight again.     /68 (BP Location: Right arm, Patient Position: Sitting, Cuff Size: Adult)   Pulse 79   Ht 172.7 cm (68\")   Wt 95 kg (209 lb 8 oz)   SpO2 99%   BMI 31.85 kg/m²   Body mass index is 31.85 kg/m².      PE:   Physical Exam  Constitutional:       General: He is not in acute distress.     Appearance: He is well-developed.   HENT:      Head: Normocephalic and atraumatic.   Abdominal:      General: There is no distension.      Palpations: Abdomen is soft.   Musculoskeletal:         General: Normal range of motion.   Neurological:      Mental Status: He is alert.   Psychiatric:         Thought Content: Thought content normal.         Assessmnet:   1. Thrombosed external hemorrhoid    2. Anal pain    - Improved    Plan:  - Discussed treatment options with Pt for the increased sphincter tone including continuation of the Diltiazem/Lidocaine compound cream vs chemical sphincterotomy with Botox vs LIAS. Both procedures, success rates, recovery time, expected outcomes, as all as risks and benefits discussed. After discussing options, Pt states that he learned stretches during pelvic floor PT that he has not performed lately. Would like to restart these exercises and continue the compound cream. Will " consider a flexible sigmoidoscopy with chemical sphincterotomy with Botox in the future if symptoms do not improve.   - Follow up as needed for new or worsening symptoms.

## (undated) DEVICE — APPL DURAPREP IODOPHOR APL 26ML

## (undated) DEVICE — ADHS SKIN DERMABOND TOP ADVANCED

## (undated) DEVICE — SUT VIC 1 OS8 27IN J699H

## (undated) DEVICE — GLV SURG BIOGEL LTX PF 7 1/2

## (undated) DEVICE — ANTIBACTERIAL UNDYED BRAIDED (POLYGLACTIN 910), SYNTHETIC ABSORBABLE SUTURE: Brand: COATED VICRYL

## (undated) DEVICE — GLV SURG BIOGEL LTX PF 7

## (undated) DEVICE — 6.0MM PRECISION ROUND

## (undated) DEVICE — PK NEURO SPINE 40

## (undated) DEVICE — ENCORE® LATEX ORTHO SIZE 8, STERILE LATEX POWDER-FREE SURGICAL GLOVE: Brand: ENCORE

## (undated) DEVICE — OIL CARTRIDGE: Brand: CORE, MAESTRO

## (undated) DEVICE — GOWN,ECLIPSE,FABRIC-REINFORCED,X-LARGE: Brand: MEDLINE

## (undated) DEVICE — OCCLUSIVE GAUZE STRIP,3% BISMUTH TRIBROMOPHENATE IN PETROLATUM BLEND: Brand: XEROFORM

## (undated) DEVICE — SMOKE EVACUATION TUBING WITH 7/8 IN TO 1/4 IN REDUCER: Brand: BUFFALO FILTER

## (undated) DEVICE — SYR LUERLOK 20CC

## (undated) DEVICE — SPONGE,LAP,12"X12",XR,ST,5/PK,40PK/CS: Brand: MEDLINE

## (undated) DEVICE — MEDI-VAC YANKAUER SUCTION HANDLE W/BULBOUS TIP: Brand: CARDINAL HEALTH

## (undated) DEVICE — DISPOSABLE BIPOLAR CABLE 12FT. (3.6M): Brand: KIRWAN

## (undated) DEVICE — DRSNG GZ PETROLTM XEROFORM CURAD 1X8IN STRL

## (undated) DEVICE — ELECTRD BLD EXT EDGE 1P COAT 6.5IN STRL

## (undated) DEVICE — CONN TBG Y 5 IN 1 LF STRL

## (undated) DEVICE — NDL SPINE 18G 31/2IN PNK

## (undated) DEVICE — SUT MNCRYL PLS ANTIB UD 4/0 PS2 18IN

## (undated) DEVICE — DIFFUSER: Brand: CORE, MAESTRO